# Patient Record
Sex: FEMALE | Race: WHITE | NOT HISPANIC OR LATINO | URBAN - METROPOLITAN AREA
[De-identification: names, ages, dates, MRNs, and addresses within clinical notes are randomized per-mention and may not be internally consistent; named-entity substitution may affect disease eponyms.]

---

## 2021-01-10 ENCOUNTER — EMERGENCY (EMERGENCY)
Facility: HOSPITAL | Age: 41
LOS: 1 days | Discharge: HOME | End: 2021-01-10
Attending: EMERGENCY MEDICINE | Admitting: EMERGENCY MEDICINE
Payer: MEDICAID

## 2021-01-10 VITALS
RESPIRATION RATE: 18 BRPM | DIASTOLIC BLOOD PRESSURE: 106 MMHG | SYSTOLIC BLOOD PRESSURE: 171 MMHG | OXYGEN SATURATION: 100 % | TEMPERATURE: 98 F | HEART RATE: 72 BPM | HEIGHT: 65 IN

## 2021-01-10 LAB
ALBUMIN SERPL ELPH-MCNC: 4.6 G/DL — SIGNIFICANT CHANGE UP (ref 3.3–5)
ALP SERPL-CCNC: 104 U/L — SIGNIFICANT CHANGE UP (ref 40–120)
ALT FLD-CCNC: 10 U/L — SIGNIFICANT CHANGE UP (ref 4–33)
ANION GAP SERPL CALC-SCNC: 16 MMOL/L — HIGH (ref 7–14)
AST SERPL-CCNC: 23 U/L — SIGNIFICANT CHANGE UP (ref 4–32)
BASOPHILS # BLD AUTO: 0.03 K/UL — SIGNIFICANT CHANGE UP (ref 0–0.2)
BASOPHILS NFR BLD AUTO: 0.4 % — SIGNIFICANT CHANGE UP (ref 0–2)
BILIRUB SERPL-MCNC: 1 MG/DL — SIGNIFICANT CHANGE UP (ref 0.2–1.2)
BUN SERPL-MCNC: 8 MG/DL — SIGNIFICANT CHANGE UP (ref 7–23)
CALCIUM SERPL-MCNC: 9.3 MG/DL — SIGNIFICANT CHANGE UP (ref 8.4–10.5)
CHLORIDE SERPL-SCNC: 95 MMOL/L — LOW (ref 98–107)
CO2 SERPL-SCNC: 24 MMOL/L — SIGNIFICANT CHANGE UP (ref 22–31)
CREAT SERPL-MCNC: 0.57 MG/DL — SIGNIFICANT CHANGE UP (ref 0.5–1.3)
EOSINOPHIL # BLD AUTO: 0.02 K/UL — SIGNIFICANT CHANGE UP (ref 0–0.5)
EOSINOPHIL NFR BLD AUTO: 0.2 % — SIGNIFICANT CHANGE UP (ref 0–6)
GLUCOSE SERPL-MCNC: 101 MG/DL — HIGH (ref 70–99)
HCG SERPL-ACNC: <5 MIU/ML — SIGNIFICANT CHANGE UP
HCT VFR BLD CALC: 38.8 % — SIGNIFICANT CHANGE UP (ref 34.5–45)
HGB BLD-MCNC: 13.1 G/DL — SIGNIFICANT CHANGE UP (ref 11.5–15.5)
IANC: 6.44 K/UL — SIGNIFICANT CHANGE UP (ref 1.5–8.5)
IMM GRANULOCYTES NFR BLD AUTO: 0.4 % — SIGNIFICANT CHANGE UP (ref 0–1.5)
LYMPHOCYTES # BLD AUTO: 1.24 K/UL — SIGNIFICANT CHANGE UP (ref 1–3.3)
LYMPHOCYTES # BLD AUTO: 15.1 % — SIGNIFICANT CHANGE UP (ref 13–44)
MCHC RBC-ENTMCNC: 32.3 PG — SIGNIFICANT CHANGE UP (ref 27–34)
MCHC RBC-ENTMCNC: 33.8 GM/DL — SIGNIFICANT CHANGE UP (ref 32–36)
MCV RBC AUTO: 95.6 FL — SIGNIFICANT CHANGE UP (ref 80–100)
MONOCYTES # BLD AUTO: 0.47 K/UL — SIGNIFICANT CHANGE UP (ref 0–0.9)
MONOCYTES NFR BLD AUTO: 5.7 % — SIGNIFICANT CHANGE UP (ref 2–14)
NEUTROPHILS # BLD AUTO: 6.44 K/UL — SIGNIFICANT CHANGE UP (ref 1.8–7.4)
NEUTROPHILS NFR BLD AUTO: 78.2 % — HIGH (ref 43–77)
NRBC # BLD: 0 /100 WBCS — SIGNIFICANT CHANGE UP
NRBC # FLD: 0 K/UL — SIGNIFICANT CHANGE UP
PLATELET # BLD AUTO: 252 K/UL — SIGNIFICANT CHANGE UP (ref 150–400)
POTASSIUM SERPL-MCNC: 3.3 MMOL/L — LOW (ref 3.5–5.3)
POTASSIUM SERPL-SCNC: 3.3 MMOL/L — LOW (ref 3.5–5.3)
PROT SERPL-MCNC: 8 G/DL — SIGNIFICANT CHANGE UP (ref 6–8.3)
RBC # BLD: 4.06 M/UL — SIGNIFICANT CHANGE UP (ref 3.8–5.2)
RBC # FLD: 13.7 % — SIGNIFICANT CHANGE UP (ref 10.3–14.5)
SODIUM SERPL-SCNC: 135 MMOL/L — SIGNIFICANT CHANGE UP (ref 135–145)
TOXICOLOGY SCREEN, DRUGS OF ABUSE, SERUM RESULT: SIGNIFICANT CHANGE UP
TSH SERPL-MCNC: 1.95 UIU/ML — SIGNIFICANT CHANGE UP (ref 0.27–4.2)
WBC # BLD: 8.23 K/UL — SIGNIFICANT CHANGE UP (ref 3.8–10.5)
WBC # FLD AUTO: 8.23 K/UL — SIGNIFICANT CHANGE UP (ref 3.8–10.5)

## 2021-01-10 PROCEDURE — 99285 EMERGENCY DEPT VISIT HI MDM: CPT

## 2021-01-10 PROCEDURE — 70450 CT HEAD/BRAIN W/O DYE: CPT | Mod: 26

## 2021-01-10 RX ORDER — SODIUM CHLORIDE 9 MG/ML
1000 INJECTION INTRAMUSCULAR; INTRAVENOUS; SUBCUTANEOUS ONCE
Refills: 0 | Status: COMPLETED | OUTPATIENT
Start: 2021-01-10 | End: 2021-01-10

## 2021-01-10 RX ORDER — HALOPERIDOL DECANOATE 100 MG/ML
5 INJECTION INTRAMUSCULAR ONCE
Refills: 0 | Status: COMPLETED | OUTPATIENT
Start: 2021-01-10 | End: 2021-01-10

## 2021-01-10 RX ADMIN — Medication 2 MILLIGRAM(S): at 20:32

## 2021-01-10 RX ADMIN — Medication 2 MILLIGRAM(S): at 22:08

## 2021-01-10 RX ADMIN — HALOPERIDOL DECANOATE 5 MILLIGRAM(S): 100 INJECTION INTRAMUSCULAR at 22:08

## 2021-01-10 RX ADMIN — SODIUM CHLORIDE 1000 MILLILITER(S): 9 INJECTION INTRAMUSCULAR; INTRAVENOUS; SUBCUTANEOUS at 21:49

## 2021-01-10 NOTE — ED ADULT NURSE NOTE - ED STAT RN HANDOFF DETAILS
Pt discharged by provider with instructions.  Pt verbalizes understanding.  Pt denies S/I and H/I.  Pt denies visual or auditory hallucinations or other complaints.  Pt called parents for ride who instructed pt to arrange a taxi that they will pay for.  Triage RNs aware of situation.  Pt arranging taxi in triage waiting area and will wait for taxi.

## 2021-01-10 NOTE — ED PROVIDER NOTE - NSFOLLOWUPINSTRUCTIONS_ED_ALL_ED_FT
Follow up with your PCP in 24-48 hours.   Return to the ER if you develop any new or worsening symptoms such as chest pain, shortness of breath, numbness, weakness, abdominal pain, nausea, vomiting, or visual changes.

## 2021-01-10 NOTE — ED PROVIDER NOTE - PATIENT PORTAL LINK FT
You can access the FollowMyHealth Patient Portal offered by [facility] by registering at the following website: [email or web address]. By joining [employer]’s FollowMyHealth portal, you will also be able to view your health information using other applications (apps) compatible with our system.
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
## 2021-01-10 NOTE — ED PROVIDER NOTE - NS ED ROS FT
Constitutional:  (-) fever, (-) chills, (-) lethargy  Eyes:  (-) eye pain (-) visual changes  ENMT: (-) nasal discharge, (-) sore throat. (-) neck pain or stiffness  Cardiac: (-) chest pain (-) palpitations  Respiratory:  (-) cough (-) respiratory distress.   GI:  (-) nausea (-) vomiting (-) diarrhea (-) abdominal pain.  :  (-) dysuria (-) frequency (-) burning.  MS:  (-) back pain (-) joint pain.  Neuro:  (-) headache (-) numbness (-) tingling (-) focal weakness  Skin:  (-) rash  Psych: + anxiety  Except as documented in the HPI,  all other systems are negative

## 2021-01-10 NOTE — ED PROVIDER NOTE - PHYSICAL EXAMINATION
CONSTITUTIONAL: anxious appearing, tremulous  SKIN: Warm dry, normal skin turgor  HEAD: NCAT  EYES:  PERRLA, b/l pupil dilation responsive to light, pt unable to cooperate with EOM examination eyes darting around room unable to assess for nystagmus  ENT: white film over tongue, dry mouth  NECK: Supple; non tender. Full ROM.  CARD: rate 110, regular rhythm, no murmurs.  RESP: clear to ausculation b/l. No crackles or wheezing.  NEURO: Pt states she is too anxious to cooperate with neuro exam  PSYCH: Anxious, some disorganized thinking but a/ox3, pt visibly upset, paranoid and overly anxious about needles.

## 2021-01-10 NOTE — ED ADULT NURSE NOTE - OBJECTIVE STATEMENT
Babar RN: 40 year old female, hx of ETOH/Substance abuse, anxiety brought in by EMS. pt states "I was minding my own business at home, and when the police came to check on my mom, they brought me here". pt very anxious, appears paranoid. pt refusing to get undressed and keeps repeating "I don't even know why I am here". pt endorses using crystal meth last night and reports drinking wine today. pt denies hx of seizures. pt reports intermittent blurry vision today, with diarrhea. pt denies headache, chest pain, fevers, cough, SOB, n/v, urinary sx. pt denies SI/HI. MD at bedside during RN assessment and remains at bedside for eval. handoff report given to primary RN.

## 2021-01-10 NOTE — ED ADULT TRIAGE NOTE - CHIEF COMPLAINT QUOTE
pt with co increased depression and anxiety drank today but reports has a drinking habit. Pt also admits to use of crystal meth yesterday. Pt denies sob or chest pain denies thoughts of SI.
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
## 2021-01-10 NOTE — ED PROVIDER NOTE - PROGRESS NOTE DETAILS
pt feeling well. calm/cooperative, clinically sober. requesting to leave. cleared by psych. seen by social work and given detox information. strict return precautions given.

## 2021-01-11 VITALS
TEMPERATURE: 98 F | HEART RATE: 76 BPM | OXYGEN SATURATION: 100 % | SYSTOLIC BLOOD PRESSURE: 136 MMHG | RESPIRATION RATE: 18 BRPM | DIASTOLIC BLOOD PRESSURE: 71 MMHG

## 2021-01-11 DIAGNOSIS — F10.929 ALCOHOL USE, UNSPECIFIED WITH INTOXICATION, UNSPECIFIED: ICD-10-CM

## 2021-01-11 LAB — SARS-COV-2 RNA SPEC QL NAA+PROBE: SIGNIFICANT CHANGE UP

## 2021-01-11 PROCEDURE — 90792 PSYCH DIAG EVAL W/MED SRVCS: CPT | Mod: GC
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
## 2021-01-11 NOTE — ED BEHAVIORAL HEALTH ASSESSMENT NOTE - SUMMARY
Pt is a 39 YO F w/ history of polysubstance abuse including methamphetamine and heroin, who presented to ED BIB EMS  for aggressive behavior in context of methamphetamine and alcohol intoxication.  Psychiatry has been evaluated for paranoia.  While pt initially reported that she intermittently uses crystal meth and drinks alcohol, collateral from mother indicates severe polysubstance use including daily alcohol and methamphetamine abuse.  Paranoia related to identity theft appears to be reality-based.  Pt currently denying suicidal or homicidal ideation.  Pt does not meet criteria for inpatient psychiatric hospitalization.  Paranoia appears to be secondary to amphetamine and alcohol abuse which warrants voluntary rehabilitation.    Recommend symptom-triggered  CIWA while in ED as pt was noted to be mildly tremulous and consumes alcohol daily, discussed with Dr. Womack.
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
## 2021-01-11 NOTE — ED BEHAVIORAL HEALTH NOTE - BEHAVIORAL HEALTH NOTE
Per provider, Dr. Courtney, pt is in need of treatment resources. SW met pt at bedside. Pt shared that she is not sure if she wants to do inpatient treatment. SW gave pt list of treatment resources and telephonic SBIRT information.

## 2021-01-11 NOTE — ED BEHAVIORAL HEALTH ASSESSMENT NOTE - DESCRIPTION
Currently lives with mother, not working, history of polysubstance abuse Pt required IV Haldol 5mg, Ativan 4mg (IV 2mg, PO 2mg), was subsequently lethargic.   In ED pt was noted to have bottle of vodka in her pocket that was later seen by staff in  note.   Vital Signs Last 24 Hrs  T(C): 36.6 (10 Michael 2021 22:24), Max: 36.7 (10 Michael 2021 19:10)  T(F): 97.8 (10 Michael 2021 22:24), Max: 98 (10 Michael 2021 19:10)  HR: 66 (10 Michael 2021 23:36) (66 - 80)  BP: 125/60 (10 Michael 2021 23:36) (125/60 - 171/106)  BP(mean): --  RR: 17 (10 Michael 2021 23:36) (17 - 19)  SpO2: 100% (10 Michael 2021 23:36) (100% - 100%)  BAL negative                        13.1   8.23  )-----------( 252      ( 10 Michael 2021 22:24 )             38.8   01-10  135  |  95<L>  |  8   ----------------------------<  101<H>  3.3<L>   |  24  |  0.57  Ca    9.3      10 Michael 2021 22:24  TPro  8.0  /  Alb  4.6  /  TBili  1.0  /  DBili  x   /  AST  23  /  ALT  10  /  AlkPhos  104  01-10 None known

## 2021-01-11 NOTE — ED ADULT NURSE REASSESSMENT NOTE - NS ED NURSE REASSESS COMMENT FT1
Pt received to ; pt presents groggy, unable to participate in fluid conversation; bottle of vodka was found in patients underwear by PES upon arrival to psych ER. MATY and MD made aware.
Pt woke up and asking to be discharged; ambulating with steady gait and answering RN reassessment questions appropriately. Medical MD made aware.
Pt attempted to arrange ride with her mother but family did not pick-up phone.  Pt will attempt again at a later hour.  Pt sleeping in  room 5.

## 2021-01-11 NOTE — ED BEHAVIORAL HEALTH ASSESSMENT NOTE - HPI (INCLUDE ILLNESS QUALITY, SEVERITY, DURATION, TIMING, CONTEXT, MODIFYING FACTORS, ASSOCIATED SIGNS AND SYMPTOMS)
Pt is a 41 YO F w/ history of polysubstance abuse including methamphetamine and heroin, who presented to ED BIB EMS  for aggressive behavior in context of methamphetamine and alcohol intoxication.  Psychiatry has been evaluated for paranoia.  In ED pt was noted to have bottle of vodka in her pocket that was later seen by staff in  note.  Pt received Haldol 5mg and Ativan 4mg prior to consultation, was lethargic and required verbal and sometimes physical stimulus.  Pt reports "things were different" yesterday after using crystal meth that she believes may have been laced with other substance.  She notes feeling at times like things around her "aren't real," and has intermittently endorsed hearing "strange sounds" but could not specify any further.  She denies IOR, thought broadcasting, thought insertion, or thought withdrawal.  She reports feeling depressed but denies feeling suicidal.  She reports she has never been seen by psychiatrist in past but is currently interested in seeing a therapist.  She denies any HI.    Per collateral from mother (see ED  note for full details), pt has been using methamphetamine on a daily basis since moving from [place] this past August.  Pt also has been drinking roughly a bottle of wine a day.  Mother is concerned about worsening substance abuse which may have contributed to incident last week of throwing objects in the house while intoxicated.  However mother denies acute safety concerns, especially during times of sobriety.
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
## 2023-03-15 PROBLEM — Z00.00 ENCOUNTER FOR PREVENTIVE HEALTH EXAMINATION: Status: ACTIVE | Noted: 2023-03-15

## 2023-03-21 ENCOUNTER — APPOINTMENT (OUTPATIENT)
Dept: VASCULAR SURGERY | Facility: CLINIC | Age: 43
End: 2023-03-21